# Patient Record
Sex: FEMALE | NOT HISPANIC OR LATINO
[De-identification: names, ages, dates, MRNs, and addresses within clinical notes are randomized per-mention and may not be internally consistent; named-entity substitution may affect disease eponyms.]

---

## 2024-01-02 PROBLEM — Z00.00 ENCOUNTER FOR PREVENTIVE HEALTH EXAMINATION: Status: ACTIVE | Noted: 2024-01-02

## 2024-01-03 ENCOUNTER — APPOINTMENT (OUTPATIENT)
Dept: GERIATRICS | Facility: ASSISTED LIVING FACILITY | Age: 79
End: 2024-01-03
Payer: MEDICARE

## 2024-01-03 VITALS
WEIGHT: 134 LBS | RESPIRATION RATE: 14 BRPM | HEART RATE: 58 BPM | OXYGEN SATURATION: 97 % | SYSTOLIC BLOOD PRESSURE: 118 MMHG | DIASTOLIC BLOOD PRESSURE: 74 MMHG

## 2024-01-03 DIAGNOSIS — K59.01 SLOW TRANSIT CONSTIPATION: ICD-10-CM

## 2024-01-03 DIAGNOSIS — M19.90 UNSPECIFIED OSTEOARTHRITIS, UNSPECIFIED SITE: ICD-10-CM

## 2024-01-03 DIAGNOSIS — I10 ESSENTIAL (PRIMARY) HYPERTENSION: ICD-10-CM

## 2024-01-03 PROCEDURE — 99349 HOME/RES VST EST MOD MDM 40: CPT

## 2024-01-03 NOTE — ASSESSMENT
[FreeTextEntry1] : Will c/w FeSO4 for now pending CBC and labs  Need further collateral info from daughter, including potential copy of HCP. She is NOK. I called and left a VM  Need to explore GOC. Currently full code; no MOLST on file.  Will see podiatry for onychomycosis.  Next visit in 4 weeks or PRN

## 2024-01-03 NOTE — PHYSICAL EXAM
[Alert] : alert [No Acute Distress] : in no acute distress [Normal Outer Ear/Nose] : the ears and nose were normal in appearance [Normal Appearance] : the appearance of the neck was normal [Supple] : the neck was supple [No Respiratory Distress] : no respiratory distress [No Acc Muscle Use] : no accessory muscle use [Respiration, Rhythm And Depth] : normal respiratory rhythm and effort [Auscultation Breath Sounds / Voice Sounds] : lungs were clear to auscultation bilaterally [Normal S1, S2] : normal S1 and S2 [Heart Rate And Rhythm] : heart rate was normal and rhythm regular [Edema] : edema was not present [Bowel Sounds] : normal bowel sounds [Abdomen Tenderness] : non-tender [Abdomen Soft] : soft [Normal Color / Pigmentation] : normal skin color and pigmentation [Normal Turgor] : normal skin turgor [de-identified] : does not regularly follow simple commands. sitting in wheelchair [de-identified] : onychomycosis of toenails [de-identified] : L hemiparesis. 0/5 strength SHANIQUE [de-identified] : smiling and laughing (inappropriately). no agitation. A+Ox0

## 2024-01-03 NOTE — REASON FOR VISIT
[Initial Evaluation] : an initial evaluation [Formal Caregiver] : formal caregiver [FreeTextEntry3] : ABIODUN Sotelo

## 2024-01-03 NOTE — HISTORY OF PRESENT ILLNESS
[No falls in past year] : Patient reported no falls in the past year [] : Patient is incontinent. [Completely Dependent] : Completely dependent. [Medical reason not done] : Medical reason not done [FreeTextEntry1] : 78 year old woman w/ PMH advanced dementia (FAST 7A), prior CVA w/ L hemiplegia, HTN, constipation, OA, and ?iron deficiency anemia presents for initial visit after moving into the [facility] this week.  Patient moved from [place], [place].   There are very limited records available for review.  I reached out to the patient's only daughter (?HCP) Justa Figueroa ([phone]) but had no answer. Left a VM.  Patient reportedly has a "great appetite" as per care manager Kathi. Able to partially feed herself with finger foods. She states a few words but does not really respond to conversation. She is fully dependent on all IADLs and most ADLs.  ROS otherwise negative as per RN and care manager. Has not had any agitation. Takes meds as prescribed. Sleeping ok. No falls. Needs Travis lift for transfers.  CVA: Unclear timing.  - is on atorvastatin 40 mg qd and ASA 81 mg qd  Advanced dementia: FAST 7A - remains on rivastigmine patch - ?AD vs. mixed dementia  HTN: Controlled. - on norvasc 10 mg qd  Anemia: No labs for review - is on FeSO4 5 ml liquid BID  Constipation: On senna  SH - reportedly was an  - Has one daughter, Justa Figueroa ([phone]) - No HCP on file [FreeTextEntry5] : can do finger foods [de-identified] : advanced dementia [AdvancecareDate] : 01/24 [FreeTextEntry4] : Unclear if completed Reportedly only one daughter, Justa Figueroa, who would be NOK

## 2024-01-05 RX ORDER — POLYETHYLENE GLYCOL 3350 17 G/17G
17 POWDER, FOR SOLUTION ORAL
Qty: 30 | Refills: 4 | Status: ACTIVE | COMMUNITY
Start: 2024-01-05 | End: 1900-01-01

## 2024-01-05 RX ORDER — DOCUSATE SODIUM 100 MG/1
100 CAPSULE ORAL
Qty: 30 | Refills: 0 | Status: DISCONTINUED | COMMUNITY
Start: 2024-01-03 | End: 2024-01-05

## 2024-01-18 LAB
ALBUMIN SERPL ELPH-MCNC: 4.2 G/DL
ALP BLD-CCNC: 100 U/L
ALT SERPL-CCNC: 26 U/L
ANION GAP SERPL CALC-SCNC: 14 MMOL/L
AST SERPL-CCNC: 19 U/L
BASOPHILS # BLD AUTO: 0.04 K/UL
BASOPHILS NFR BLD AUTO: 0.7 %
BILIRUB SERPL-MCNC: 0.4 MG/DL
BUN SERPL-MCNC: 39 MG/DL
CALCIUM SERPL-MCNC: 9.7 MG/DL
CHLORIDE SERPL-SCNC: 109 MMOL/L
CHOLEST SERPL-MCNC: 157 MG/DL
CO2 SERPL-SCNC: 25 MMOL/L
CREAT SERPL-MCNC: 0.54 MG/DL
EGFR: 93 ML/MIN/1.73M2
EOSINOPHIL # BLD AUTO: 0.26 K/UL
EOSINOPHIL NFR BLD AUTO: 4.3 %
GLUCOSE SERPL-MCNC: 73 MG/DL
HCT VFR BLD CALC: 45.8 %
HDLC SERPL-MCNC: 51 MG/DL
HGB BLD-MCNC: 14 G/DL
IMM GRANULOCYTES NFR BLD AUTO: 0.2 %
LDLC SERPL CALC-MCNC: 85 MG/DL
LYMPHOCYTES # BLD AUTO: 1.19 K/UL
LYMPHOCYTES NFR BLD AUTO: 19.7 %
MAN DIFF?: NORMAL
MCHC RBC-ENTMCNC: 30.4 PG
MCHC RBC-ENTMCNC: 30.6 GM/DL
MCV RBC AUTO: 99.3 FL
MONOCYTES # BLD AUTO: 0.35 K/UL
MONOCYTES NFR BLD AUTO: 5.8 %
NEUTROPHILS # BLD AUTO: 4.19 K/UL
NEUTROPHILS NFR BLD AUTO: 69.3 %
NONHDLC SERPL-MCNC: 106 MG/DL
PLATELET # BLD AUTO: 256 K/UL
POTASSIUM SERPL-SCNC: 4.3 MMOL/L
PROT SERPL-MCNC: 6.8 G/DL
RBC # BLD: 4.61 M/UL
RBC # FLD: 14.6 %
SODIUM SERPL-SCNC: 148 MMOL/L
TRIGL SERPL-MCNC: 115 MG/DL
TSH SERPL-ACNC: 1.59 UIU/ML
VIT B12 SERPL-MCNC: 364 PG/ML
WBC # FLD AUTO: 6.04 K/UL

## 2024-01-19 RX ORDER — ACETAMINOPHEN 500 MG/1
500 TABLET ORAL
Qty: 360 | Refills: 1 | Status: ACTIVE | COMMUNITY
Start: 2024-01-03 | End: 1900-01-01

## 2024-01-19 RX ORDER — ASPIRIN 81 MG/1
81 TABLET, DELAYED RELEASE ORAL DAILY
Qty: 90 | Refills: 1 | Status: ACTIVE | COMMUNITY
Start: 2024-01-03 | End: 1900-01-01

## 2024-01-31 RX ORDER — FERROUS SULFATE 300 MG/5ML
300 (60 FE) SOLUTION ORAL DAILY
Qty: 1 | Refills: 0 | Status: DISCONTINUED | COMMUNITY
Start: 2024-01-03 | End: 2024-01-31

## 2024-02-14 ENCOUNTER — APPOINTMENT (OUTPATIENT)
Dept: GERIATRICS | Facility: ASSISTED LIVING FACILITY | Age: 79
End: 2024-02-14
Payer: SELF-PAY

## 2024-02-14 VITALS
OXYGEN SATURATION: 96 % | WEIGHT: 144 LBS | RESPIRATION RATE: 12 BRPM | DIASTOLIC BLOOD PRESSURE: 74 MMHG | SYSTOLIC BLOOD PRESSURE: 128 MMHG | HEART RATE: 58 BPM

## 2024-02-14 DIAGNOSIS — I69.359 HEMIPLEGIA AND HEMIPARESIS FOLLOWING CEREBRAL INFARCTION AFFECTING UNSPECIFIED SIDE: ICD-10-CM

## 2024-02-14 PROCEDURE — 99349 HOME/RES VST EST MOD MDM 40: CPT

## 2024-02-14 NOTE — REASON FOR VISIT
· Assessment		  IMPRESSION:  Left antecubital fossa with superficial non suppurative thrombophlebitis of left basilic vein  Vascular surgical evaluation : no intervention at this point  No sepsis  BCx 7/22, 23,24   NG  UCx E fecalis  Asymptomatic bacteruria  No pyelonephritis    RECOMMENDATIONS:  Warm compressors to site  Change iv ancef to po Keflex 500 mg q8h for 5 more days  F/u with vascular surgery  Recall prn please [Follow-Up] : a follow-up visit [Formal Caregiver] : formal caregiver [FreeTextEntry3] : ABIODUN Sotelo

## 2024-02-14 NOTE — HISTORY OF PRESENT ILLNESS
[] : Patient is incontinent. [Completely Dependent] : Completely dependent. [Medical reason not done] : Medical reason not done [Reviewed updated] : Reviewed, updated [Name: ___] : Health Care Proxy's Name: [unfilled]  [Relationship: ___] : Relationship: [unfilled] [DNR] : DNR [DNI] : DNI [Last Verification Date: ___] : Last Verification Date: [unfilled] [FreeTextEntry1] : 79 year old woman w/ PMH advanced dementia (FAST 7A), prior CVA w/ L hemiplegia, HTN, constipation, OA, and ?iron deficiency anemia presents for f/u visit after moving into the [address] 1/2024.  Patient moved from [place], [place]. Previously in memory care unit at [facility] there.   Since my initial visit, I have spoken extensively with patient's daughter, Justa over the phone. Patient has been largely non-ambulatory for nearly 2 years.   ROS otherwise negative as per RN and care manager, Jorge Alberto. Has not had any agitation. Takes meds as prescribed. Sleeping ok. No falls. Needs Travis lift for transfers. Has reportedly been eating "well" (needs assistance w/ feeding) and drinking ok.  She has been stable and relatively unchanged (if not improved) in the last month.   She has been following w/ PT/OT/ST.   Hypernatremia: Na 148 on labs 1/2024. Daughter notes this was an issue in the past. She has been eating better - pending f/u labs  CVA: Large R sided CVA 9/2023 while in a memory care unit - is on atorvastatin 40 mg qd and ASA 81 mg qd  Advanced dementia: FAST 7A - remains on rivastigmine patch - ?AD vs. mixed dementia  HTN: Controlled. - on norvasc 10 mg qd  Anemia: Hgb 14 on CBC from initial visit 1/2024 - FeSO4 5 ml liquid BID was d/c'ed  Constipation: On senna  SH - reportedly was an  - Has one daughter, Justa Figueroa ([phone]) - No HCP on file [Any fall with injury in past year] : Patient reported fall with injury in the past year [FreeTextEntry5] : can do finger foods [de-identified] : advanced dementia [AdvancecareDate] : 01/24 [FreeTextEntry4] : MOLST scanned into EMR

## 2024-02-14 NOTE — PHYSICAL EXAM
[Alert] : alert [Normal Outer Ear/Nose] : the ears and nose were normal in appearance [Normal Appearance] : the appearance of the neck was normal [Supple] : the neck was supple [No Respiratory Distress] : no respiratory distress [No Acc Muscle Use] : no accessory muscle use [Respiration, Rhythm And Depth] : normal respiratory rhythm and effort [Auscultation Breath Sounds / Voice Sounds] : lungs were clear to auscultation bilaterally [Normal S1, S2] : normal S1 and S2 [Edema] : edema was not present [Heart Rate And Rhythm] : heart rate was normal and rhythm regular [Bowel Sounds] : normal bowel sounds [Abdomen Tenderness] : non-tender [Abdomen Soft] : soft [Normal Color / Pigmentation] : normal skin color and pigmentation [Normal Turgor] : normal skin turgor [de-identified] : does not regularly follow simple commands. sitting in wheelchair. some verbalizations are tangential [de-identified] : onychomycosis of toenails [de-identified] : L hemiparesis. 0/5 strength SHANIQUE [de-identified] : smiling and laughing (inappropriately). no agitation. A+Ox0

## 2024-02-16 LAB
ANION GAP SERPL CALC-SCNC: 10 MMOL/L
BASOPHILS # BLD AUTO: 0.05 K/UL
BASOPHILS NFR BLD AUTO: 0.9 %
BUN SERPL-MCNC: 30 MG/DL
CALCIUM SERPL-MCNC: 9.4 MG/DL
CHLORIDE SERPL-SCNC: 106 MMOL/L
CO2 SERPL-SCNC: 26 MMOL/L
CREAT SERPL-MCNC: 0.55 MG/DL
EGFR: 92 ML/MIN/1.73M2
EOSINOPHIL # BLD AUTO: 0.33 K/UL
EOSINOPHIL NFR BLD AUTO: 5.7 %
GLUCOSE SERPL-MCNC: 62 MG/DL
HCT VFR BLD CALC: 36.7 %
HGB BLD-MCNC: 11.6 G/DL
IMM GRANULOCYTES NFR BLD AUTO: 0.2 %
LYMPHOCYTES # BLD AUTO: 1.3 K/UL
LYMPHOCYTES NFR BLD AUTO: 22.3 %
MAGNESIUM SERPL-MCNC: 2.1 MG/DL
MAN DIFF?: NORMAL
MCHC RBC-ENTMCNC: 31.6 GM/DL
MCHC RBC-ENTMCNC: 32.2 PG
MCV RBC AUTO: 101.9 FL
MONOCYTES # BLD AUTO: 0.43 K/UL
MONOCYTES NFR BLD AUTO: 7.4 %
NEUTROPHILS # BLD AUTO: 3.72 K/UL
NEUTROPHILS NFR BLD AUTO: 63.5 %
PHOSPHATE SERPL-MCNC: 3.5 MG/DL
PLATELET # BLD AUTO: 284 K/UL
POTASSIUM SERPL-SCNC: 4.9 MMOL/L
RBC # BLD: 3.6 M/UL
RBC # FLD: 13.9 %
SODIUM SERPL-SCNC: 143 MMOL/L
WBC # FLD AUTO: 5.84 K/UL

## 2024-02-23 RX ORDER — ATORVASTATIN CALCIUM 40 MG/1
40 TABLET, FILM COATED ORAL DAILY
Qty: 90 | Refills: 1 | Status: ACTIVE | COMMUNITY
Start: 2024-01-03 | End: 1900-01-01

## 2024-03-03 RX ORDER — AMLODIPINE BESYLATE 10 MG/1
10 TABLET ORAL
Qty: 90 | Refills: 1 | Status: ACTIVE | COMMUNITY
Start: 2024-01-03 | End: 1900-01-01

## 2024-03-10 RX ORDER — RIVASTIGMINE 4.6 MG/24H
4.6 PATCH, EXTENDED RELEASE TRANSDERMAL
Qty: 90 | Refills: 1 | Status: ACTIVE | COMMUNITY
Start: 2024-01-03 | End: 1900-01-01

## 2024-03-21 RX ORDER — DOCUSATE SODIUM 50 MG/5ML
100 LIQUID ORAL DAILY
Qty: 1 | Refills: 3 | Status: ACTIVE | COMMUNITY
Start: 2024-03-21 | End: 1900-01-01

## 2024-04-03 ENCOUNTER — APPOINTMENT (OUTPATIENT)
Dept: GERIATRICS | Facility: ASSISTED LIVING FACILITY | Age: 79
End: 2024-04-03
Payer: MEDICARE

## 2024-04-03 VITALS
SYSTOLIC BLOOD PRESSURE: 124 MMHG | WEIGHT: 144.6 LBS | DIASTOLIC BLOOD PRESSURE: 78 MMHG | RESPIRATION RATE: 14 BRPM | OXYGEN SATURATION: 96 % | HEART RATE: 69 BPM

## 2024-04-03 DIAGNOSIS — D50.8 OTHER IRON DEFICIENCY ANEMIAS: ICD-10-CM

## 2024-04-03 PROCEDURE — 99349 HOME/RES VST EST MOD MDM 40: CPT

## 2024-04-03 NOTE — HISTORY OF PRESENT ILLNESS
[FreeTextEntry1] : 79 year old woman w/ PMH advanced dementia (FAST 7A), prior CVA w/ L hemiplegia, HTN, constipation, OA, and ?iron deficiency anemia presents for f/u visit after moving into the [address] 1/2024.  Patient moved from [place], [place]. Previously in memory care unit at [facility] there.   Patient has been stable. More verbal, interacts somewhat with other residents. Has been eating and drinking relatively well. Patient has been largely non-ambulatory for nearly 2 years.   ROS otherwise negative as per RN. Has not had any agitation. Takes meds as prescribed. Sleeping ok. No falls. Needs Travis lift for transfers.  She has been following w/ PT/OT/ST.   Hypernatremia: Na 148 on labs 1/2024, decreased to 143 2/2024. Daughter notes this was an issue in the past. She has been eating and drinking ok  CVA: Large R sided CVA 9/2023 while in a memory care unit - is on atorvastatin 40 mg qd and ASA 81 mg qd  Advanced dementia: FAST 7A - remains on rivastigmine patch - ?AD vs. mixed dementia  HTN: Controlled. - on norvasc 10 mg qd  Anemia: Hgb 14 on CBC from initial visit 1/2024, dropped to 11.6 2/2024 - FeSO4 5 ml liquid BID has been d/c'ed  Constipation: On senna  SH - reportedly was an  - Has one daughter, Justa Figueroa ([phone]) - No HCP on file [FreeTextEntry5] : can do finger foods [AdvancecareDate] : 01/24 [de-identified] : advanced dementia [FreeTextEntry4] : MOLST scanned into EMR

## 2024-04-03 NOTE — PHYSICAL EXAM
[de-identified] : non ambulatory [de-identified] : sitting in wheelchair. some verbalizations are tangential [de-identified] : mild onychomycosis of toenails [de-identified] : L hemiparesis. 0/5 strength SHANIQUE [de-identified] : smiling and laughing (inappropriately). no agitation. A+Ox0

## 2024-06-05 ENCOUNTER — APPOINTMENT (OUTPATIENT)
Dept: GERIATRICS | Facility: ASSISTED LIVING FACILITY | Age: 79
End: 2024-06-05
Payer: MEDICARE

## 2024-06-05 VITALS
RESPIRATION RATE: 14 BRPM | SYSTOLIC BLOOD PRESSURE: 110 MMHG | DIASTOLIC BLOOD PRESSURE: 80 MMHG | OXYGEN SATURATION: 97 % | HEART RATE: 67 BPM

## 2024-06-05 DIAGNOSIS — I10 ESSENTIAL (PRIMARY) HYPERTENSION: ICD-10-CM

## 2024-06-05 DIAGNOSIS — E87.0 HYPEROSMOLALITY AND HYPERNATREMIA: ICD-10-CM

## 2024-06-05 DIAGNOSIS — F03.C0 UNSPECIFIED DEMENTIA, SEVERE, WITHOUT BEHAVIORAL DISTURBANCE, PSYCHOTIC DISTURBANCE, MOOD DISTURBANCE, AND ANXIETY: ICD-10-CM

## 2024-06-05 PROCEDURE — 99349 HOME/RES VST EST MOD MDM 40: CPT

## 2024-06-05 NOTE — REASON FOR VISIT
[Follow-Up] : a follow-up visit [Formal Caregiver] : formal caregiver [FreeTextEntry3] : ABIODUN Meredith

## 2024-06-05 NOTE — PHYSICAL EXAM
[Alert] : alert [Normal Outer Ear/Nose] : the ears and nose were normal in appearance [Normal Appearance] : the appearance of the neck was normal [Supple] : the neck was supple [No Respiratory Distress] : no respiratory distress [No Acc Muscle Use] : no accessory muscle use [Respiration, Rhythm And Depth] : normal respiratory rhythm and effort [Auscultation Breath Sounds / Voice Sounds] : lungs were clear to auscultation bilaterally [Normal S1, S2] : normal S1 and S2 [Heart Rate And Rhythm] : heart rate was normal and rhythm regular [Edema] : edema was not present [Bowel Sounds] : normal bowel sounds [Abdomen Tenderness] : non-tender [Abdomen Soft] : soft [Normal Color / Pigmentation] : normal skin color and pigmentation [Normal Turgor] : normal skin turgor [de-identified] : sitting in wheelchair. some verbalizations are tangential; smiling [de-identified] : non ambulatory [de-identified] : mild onychomycosis of toenails [de-identified] : L hemiparesis. 0/5 strength SHANIQUE [de-identified] : smiling and laughing (inappropriately). no agitation. A+Ox0

## 2024-06-05 NOTE — HISTORY OF PRESENT ILLNESS
[Any fall with injury in past year] : Patient reported fall with injury in the past year [] : Patient is incontinent. [Completely Dependent] : Completely dependent. [Medical reason not done] : Medical reason not done [Name: ___] : Health Care Proxy's Name: [unfilled]  [Relationship: ___] : Relationship: [unfilled] [DNR] : DNR [DNI] : DNI [Last Verification Date: ___] : Last Verification Date: [unfilled] [FreeTextEntry1] : 79 year old woman w/ PMH advanced dementia (FAST 7A), prior CVA w/ L hemiplegia, HTN, constipation, OA, and ?iron deficiency anemia presents for f/u visit after moving into the [address] 1/2024.  Patient moved from [place], [place]. Previously in memory care unit at [facility] there.   Patient has remained stable. Has been eating and drinking relatively well, but reportedly does continue to pocket food occasionally. Patient has been largely non-ambulatory for >2 years.   ROS otherwise negative as per RN. Has not had any agitation. Takes meds as prescribed. Sleeping ok. No falls. Needs Travis lift for transfers.  She has been following w/ PT/OT/ST.   Hypernatremia: Na 148 on labs 1/2024, decreased to 143 2/2024. Daughter notes this was an issue in the past. She has been eating and drinking ok  CVA: Large R sided CVA 9/2023 while in a memory care unit - is on atorvastatin 40 mg qd and ASA 81 mg qd  Advanced dementia: FAST 7A - remains on rivastigmine patch - ?AD vs. mixed dementia  HTN: Controlled. - on norvasc 10 mg qd  Anemia: Hgb 14 on CBC from initial visit 1/2024, dropped to 11.6 2/2024 - FeSO4 5 ml liquid BID has been d/c'ed  Constipation: On senna  SH - reportedly was an  - Has one daughter, Justa Figueroa ([phone]) - No HCP on file [FreeTextEntry5] : can do finger foods [de-identified] : advanced dementia [Reviewed no changes] : Reviewed, no changes [AdvancecareDate] : 06/24 [FreeTextEntry4] : MOLST scanned into EMR

## 2024-06-05 NOTE — ASSESSMENT
[FreeTextEntry1] : Still working w/ PT/OT/ST  Plan of care d/w custodial staff, RN Viri. Will d/w daughter when lab results return.   Next visit in 2 months or PRN

## 2024-06-07 LAB
ALBUMIN SERPL ELPH-MCNC: 4 G/DL
ALP BLD-CCNC: 107 U/L
ALT SERPL-CCNC: 13 U/L
ANION GAP SERPL CALC-SCNC: 17 MMOL/L
AST SERPL-CCNC: 21 U/L
BASOPHILS # BLD AUTO: 0.05 K/UL
BASOPHILS NFR BLD AUTO: 0.8 %
BILIRUB SERPL-MCNC: 0.4 MG/DL
BUN SERPL-MCNC: 22 MG/DL
CALCIUM SERPL-MCNC: 9.3 MG/DL
CHLORIDE SERPL-SCNC: 105 MMOL/L
CO2 SERPL-SCNC: 20 MMOL/L
CREAT SERPL-MCNC: 0.56 MG/DL
EGFR: 92 ML/MIN/1.73M2
EOSINOPHIL # BLD AUTO: 0.36 K/UL
EOSINOPHIL NFR BLD AUTO: 5.9 %
FERRITIN SERPL-MCNC: 80 NG/ML
GLUCOSE SERPL-MCNC: 52 MG/DL
HCT VFR BLD CALC: 33.7 %
HGB BLD-MCNC: 10.4 G/DL
IMM GRANULOCYTES NFR BLD AUTO: 0.3 %
IRON SATN MFR SERPL: 8 %
IRON SERPL-MCNC: 33 UG/DL
LYMPHOCYTES # BLD AUTO: 1.57 K/UL
LYMPHOCYTES NFR BLD AUTO: 25.8 %
MAN DIFF?: NORMAL
MCHC RBC-ENTMCNC: 29.2 PG
MCHC RBC-ENTMCNC: 30.9 GM/DL
MCV RBC AUTO: 94.7 FL
MONOCYTES # BLD AUTO: 0.48 K/UL
MONOCYTES NFR BLD AUTO: 7.9 %
NEUTROPHILS # BLD AUTO: 3.6 K/UL
NEUTROPHILS NFR BLD AUTO: 59.3 %
PLATELET # BLD AUTO: 298 K/UL
POTASSIUM SERPL-SCNC: 4 MMOL/L
PROT SERPL-MCNC: 6.7 G/DL
RBC # BLD: 3.56 M/UL
RBC # BLD: 3.56 M/UL
RBC # FLD: 12.2 %
RETICS # AUTO: 1.9 %
RETICS AGGREG/RBC NFR: 68.7 K/UL
SODIUM SERPL-SCNC: 143 MMOL/L
TIBC SERPL-MCNC: 429 UG/DL
UIBC SERPL-MCNC: 396 UG/DL
WBC # FLD AUTO: 6.08 K/UL

## 2024-06-19 RX ORDER — CARBOXYMETHYLCELLULOSE SODIUM 2.5 MG/ML
0.25 SOLUTION/ DROPS OPHTHALMIC TWICE DAILY
Qty: 1 | Refills: 3 | Status: ACTIVE | COMMUNITY
Start: 2024-01-03 | End: 1900-01-01

## 2024-06-19 RX ORDER — SENNOSIDES 8.6 MG TABLETS 8.6 MG/1
8.6 TABLET ORAL
Qty: 180 | Refills: 0 | Status: ACTIVE | COMMUNITY
Start: 2024-01-03 | End: 1900-01-01

## 2024-06-27 ENCOUNTER — RX RENEWAL (OUTPATIENT)
Age: 79
End: 2024-06-27

## 2024-07-22 ENCOUNTER — RX RENEWAL (OUTPATIENT)
Age: 79
End: 2024-07-22

## 2024-07-22 DIAGNOSIS — E78.5 HYPERLIPIDEMIA, UNSPECIFIED: ICD-10-CM

## 2024-08-26 ENCOUNTER — APPOINTMENT (OUTPATIENT)
Dept: GERIATRICS | Facility: ASSISTED LIVING FACILITY | Age: 79
End: 2024-08-26
Payer: MEDICARE

## 2024-08-26 VITALS
HEART RATE: 80 BPM | WEIGHT: 153.6 LBS | RESPIRATION RATE: 14 BRPM | OXYGEN SATURATION: 98 % | DIASTOLIC BLOOD PRESSURE: 72 MMHG | SYSTOLIC BLOOD PRESSURE: 128 MMHG

## 2024-08-26 DIAGNOSIS — F03.C0 UNSPECIFIED DEMENTIA, SEVERE, WITHOUT BEHAVIORAL DISTURBANCE, PSYCHOTIC DISTURBANCE, MOOD DISTURBANCE, AND ANXIETY: ICD-10-CM

## 2024-08-26 DIAGNOSIS — D64.9 ANEMIA, UNSPECIFIED: ICD-10-CM

## 2024-08-26 DIAGNOSIS — Z71.89 OTHER SPECIFIED COUNSELING: ICD-10-CM

## 2024-08-26 DIAGNOSIS — E87.0 HYPEROSMOLALITY AND HYPERNATREMIA: ICD-10-CM

## 2024-08-26 DIAGNOSIS — I10 ESSENTIAL (PRIMARY) HYPERTENSION: ICD-10-CM

## 2024-08-26 DIAGNOSIS — E78.5 HYPERLIPIDEMIA, UNSPECIFIED: ICD-10-CM

## 2024-08-26 PROCEDURE — 99349 HOME/RES VST EST MOD MDM 40: CPT

## 2024-08-26 NOTE — ASSESSMENT
[FreeTextEntry1] : Has gained ~9 lbs since last visit. Patient has great appetite. Appears euvolemic today. Will have residential staff reweigh as weights have been historically inaccurate.   Next visit in 2 months or PRN  RUSLAN Chanel-BC

## 2024-08-26 NOTE — HISTORY OF PRESENT ILLNESS
[Any fall with injury in past year] : Patient reported fall with injury in the past year [] : Patient is incontinent. [Completely Dependent] : Completely dependent. [Medical reason not done] : Medical reason not done [Reviewed no changes] : Reviewed, no changes [DNR] : DNR [DNI] : DNI [Last Verification Date: ___] : Last Verification Date: [unfilled] [Name: ___] : Health Care Proxy's Name: [unfilled]  [Relationship: ___] : Relationship: [unfilled] [FreeTextEntry1] : 79 year old woman w/ PMH advanced dementia (FAST 7A), prior CVA w/ L hemiplegia, HTN, constipation, OA, and ?iron deficiency anemia presents for f/u visit after moving into the [address] 1/2024.  Patient moved from [place], [place]. Previously in memory care unit at [facility] there.   Patient has remained stable. Has been eating and drinking relatively well, but reportedly does continue to pocket food occasionally. Patient has been largely non-ambulatory for >2 years.   ROS otherwise negative as per RN. Has not had any agitation. Takes meds as prescribed. Sleeping ok. No falls. Needs Travis lift for transfers.  She has been following w/ PT/OT/ST.   Hypernatremia: Na 143 on 6/2024. Daughter notes this was an issue in the past. She has been eating and drinking ok  CVA: Large R sided CVA 9/2023 while in a memory care unit - is on atorvastatin 40 mg qd and ASA 81 mg qd  Advanced dementia: FAST 7A - remains on rivastigmine patch - ?AD vs. mixed dementia  HTN: Controlled. - on norvasc 10 mg qd  Anemia: Hgb 10.4 on CBC from  6/2024, -she has iron deficiency. Iron previously d/c'd as it caused constipation and reduced PO intake -extensive discussion with Dr. Dodson after visit in June- opted to monitor CBC and determine if oral vs IV iron necessary for tx   Constipation: On senna  SH - reportedly was an  - Has one daughter, Justa Figueroa ([phone]) - No HCP on file [FreeTextEntry5] : can do finger foods [de-identified] : advanced dementia [AdvancecareDate] : 08/24 [FreeTextEntry4] : MOLST scanned into EMR

## 2024-08-26 NOTE — PHYSICAL EXAM
[Alert] : alert [Normal Outer Ear/Nose] : the ears and nose were normal in appearance [Normal Appearance] : the appearance of the neck was normal [Supple] : the neck was supple [No Respiratory Distress] : no respiratory distress [No Acc Muscle Use] : no accessory muscle use [Respiration, Rhythm And Depth] : normal respiratory rhythm and effort [Auscultation Breath Sounds / Voice Sounds] : lungs were clear to auscultation bilaterally [Normal S1, S2] : normal S1 and S2 [Heart Rate And Rhythm] : heart rate was normal and rhythm regular [Edema] : edema was not present [Bowel Sounds] : normal bowel sounds [Abdomen Tenderness] : non-tender [Abdomen Soft] : soft [Normal Turgor] : normal skin turgor [Normal Color / Pigmentation] : normal skin color and pigmentation [de-identified] : sitting in wheelchair. some verbalizations are tangential; smiling [de-identified] : non ambulatory [de-identified] : mild onychomycosis of toenails [de-identified] : L hemiparesis. 0/5 strength SHANIQUE [de-identified] : smiling and laughing (inappropriately). no agitation. A+Ox0

## 2024-08-27 ENCOUNTER — LABORATORY RESULT (OUTPATIENT)
Age: 79
End: 2024-08-27

## 2024-08-28 ENCOUNTER — LABORATORY RESULT (OUTPATIENT)
Age: 79
End: 2024-08-28

## 2024-08-29 ENCOUNTER — NON-APPOINTMENT (OUTPATIENT)
Age: 79
End: 2024-08-29

## 2024-08-29 LAB
FERRITIN SERPL-MCNC: 20 NG/ML
IRON SATN MFR SERPL: 4 %
IRON SERPL-MCNC: 14 UG/DL
TIBC SERPL-MCNC: 383 UG/DL
UIBC SERPL-MCNC: 369 UG/DL

## 2024-08-30 ENCOUNTER — LABORATORY RESULT (OUTPATIENT)
Age: 79
End: 2024-08-30

## 2024-09-04 ENCOUNTER — LABORATORY RESULT (OUTPATIENT)
Age: 79
End: 2024-09-04

## 2024-09-07 ENCOUNTER — LABORATORY RESULT (OUTPATIENT)
Age: 79
End: 2024-09-07

## 2024-09-09 ENCOUNTER — LABORATORY RESULT (OUTPATIENT)
Age: 79
End: 2024-09-09

## 2024-09-11 ENCOUNTER — APPOINTMENT (OUTPATIENT)
Dept: HEMATOLOGY ONCOLOGY | Facility: CLINIC | Age: 79
End: 2024-09-11
Payer: MEDICARE

## 2024-09-11 VITALS
TEMPERATURE: 98 F | OXYGEN SATURATION: 96 % | SYSTOLIC BLOOD PRESSURE: 132 MMHG | HEART RATE: 77 BPM | DIASTOLIC BLOOD PRESSURE: 72 MMHG

## 2024-09-11 DIAGNOSIS — D64.9 ANEMIA, UNSPECIFIED: ICD-10-CM

## 2024-09-11 DIAGNOSIS — Z82.3 FAMILY HISTORY OF STROKE: ICD-10-CM

## 2024-09-11 DIAGNOSIS — Z78.9 OTHER SPECIFIED HEALTH STATUS: ICD-10-CM

## 2024-09-11 DIAGNOSIS — Z86.2 PERSONAL HISTORY OF DISEASES OF THE BLOOD AND BLOOD-FORMING ORGANS AND CERTAIN DISORDERS INVOLVING THE IMMUNE MECHANISM: ICD-10-CM

## 2024-09-11 DIAGNOSIS — Z85.828 PERSONAL HISTORY OF OTHER MALIGNANT NEOPLASM OF SKIN: ICD-10-CM

## 2024-09-11 DIAGNOSIS — Z81.8 FAMILY HISTORY OF OTHER MENTAL AND BEHAVIORAL DISORDERS: ICD-10-CM

## 2024-09-11 DIAGNOSIS — Z92.89 PERSONAL HISTORY OF OTHER MEDICAL TREATMENT: ICD-10-CM

## 2024-09-11 PROCEDURE — 99203 OFFICE O/P NEW LOW 30 MIN: CPT

## 2024-09-11 PROCEDURE — G2211 COMPLEX E/M VISIT ADD ON: CPT

## 2024-09-11 PROCEDURE — 36415 COLL VENOUS BLD VENIPUNCTURE: CPT

## 2024-09-11 NOTE — CONSULT LETTER
[Dear  ___] : Dear  [unfilled], [Consult Letter:] : I had the pleasure of evaluating your patient, [unfilled]. [( Thank you for referring [unfilled] for consultation for _____ )] : Thank you for referring [unfilled] for consultation for [unfilled] [Please see my note below.] : Please see my note below. [Consult Closing:] : Thank you very much for allowing me to participate in the care of this patient.  If you have any questions, please do not hesitate to contact me. [Sincerely,] : Sincerely, [FreeTextEntry3] : Jordana Albarran

## 2024-09-11 NOTE — REVIEW OF SYSTEMS
[Fatigue] : fatigue [Recent Change In Weight] : ~T recent weight change [Loss of Hearing] : loss of hearing [Incontinence] : incontinence [Muscle Weakness] : muscle weakness [Confused] : confusion [Negative] : Allergic/Immunologic [Fever] : no fever [Chills] : no chills [Night Sweats] : no night sweats [Eye Pain] : no eye pain [Vision Problems] : no vision problems [Nosebleeds] : no nosebleeds [Chest Pain] : no chest pain [Lower Ext Edema] : no lower extremity edema [Shortness Of Breath] : no shortness of breath [Abdominal Pain] : no abdominal pain [Skin Rash] : no skin rash [Easy Bleeding] : no tendency for easy bleeding [Easy Bruising] : no tendency for easy bruising [FreeTextEntry2] : slight weight increase [FreeTextEntry9] : Left hemiparesis [de-identified] : Advanced dementia [de-identified] : Advanced dementia

## 2024-09-11 NOTE — ASSESSMENT
[FreeTextEntry1] : Patient is an 79 year old female with a history of COVID-19 infection in 2021, CVA stroke with left hemiplegia in 2023, dementia, chronic anemia, iron deficiency, hypertension, constipation, osteoarthritis who is referred for evaluation and management of anemia. Has advanced dementia and supportive treatment was requested without investigative procedures. Very recent blood tests are consistent with iron deficiency.  Will also evaluate for B12 and/or folate deficiency. Have ordered B12 and folate. Venipuncture was performed in the office today. Patient may benefit from iron infusions. Risks of iron infusions were discussed with the patient's HCP/POA and possible adverse effects including but not exclusive of anaphylaxis, hyper or hypotension, tachycardia, dizziness, abdominal pain, nausea, vomiting, staining of skin at extravasation sites. Informed consent was obtained and witnessed.  A list of Iron rich foods was given  and discussed. Patient's daughter was advised to call the office to discuss the results and further management.

## 2024-09-11 NOTE — PHYSICAL EXAM
[Normal] : normal appearance, no rash, nodules, vesicles, ulcers, erythema [de-identified] : Left hemiparesis, wheelchair dependent, arthritic changes in hands [de-identified] : Advanced dementia, pleasant, left hemiparesis [de-identified] : Advanced dementia, pleasant

## 2024-09-11 NOTE — REASON FOR VISIT
[Initial Consultation] : an initial consultation for [Family Member] : family member [FreeTextEntry2] : Anemia, iron deficiency anemia

## 2024-09-12 LAB
FOLATE SERPL-MCNC: 11.6 NG/ML
VIT B12 SERPL-MCNC: 411 PG/ML

## 2024-09-13 ENCOUNTER — NON-APPOINTMENT (OUTPATIENT)
Age: 79
End: 2024-09-13

## 2024-09-13 ENCOUNTER — APPOINTMENT (OUTPATIENT)
Dept: HEMATOLOGY ONCOLOGY | Facility: CLINIC | Age: 79
End: 2024-09-13

## 2024-09-13 LAB
FERRITIN SERPL-MCNC: 57 NG/ML
IRON SATN MFR SERPL: 3 %
IRON SERPL-MCNC: 15 UG/DL
TIBC SERPL-MCNC: 485 UG/DL
UIBC SERPL-MCNC: 470 UG/DL

## 2024-09-20 NOTE — PHARMACY COMMUNICATION NOTE - COMMENTS
I emailed Dr. Albarran the following:                  Hi,                  Patient Jose Figueroa (: 1945) is scheduled to come in for her Feraheme infusion/B-12 injection on 2024. I have some confusion on my part as follows: the Feraheme is to be given for 2 doses 7 days apart while the Vitamin B-12 is to be given for 2 doses 2 weeks apart. Inn the special instructions you wrote for the Vitamin B-12 to be administered on the day of the Feraheme infusions. So my question to you is should we schedule the 2nd Feraheme infusion in 2 weeks so as to give the Vitamin b-12 a  week spacing OR is it ok the give both the 2nd Feraheme infusion and the Vitamin b-12 7 days after the first dose? Please let us know how to proceed. I can have our NP adjust the order based on your response.         Thank You,         Hao Quiñonez [facility]       [phone]  She responded as follows:  Israel Bui,  The B12 should be given on the same day as the second Feraheme infusion .Sorry if order is confusing. Please have the NP adjust the order. Thank you!  Jordana  We will have our NP adjust the order so the Fwraheme and Vitamin B-12 are given the same day.

## 2024-09-23 ENCOUNTER — LABORATORY RESULT (OUTPATIENT)
Age: 79
End: 2024-09-23

## 2024-09-23 ENCOUNTER — APPOINTMENT (OUTPATIENT)
Dept: INFUSION THERAPY | Facility: CLINIC | Age: 79
End: 2024-09-23

## 2024-09-23 ENCOUNTER — OUTPATIENT (OUTPATIENT)
Dept: OUTPATIENT SERVICES | Facility: HOSPITAL | Age: 79
LOS: 1 days | End: 2024-09-23
Payer: MEDICARE

## 2024-09-23 VITALS
SYSTOLIC BLOOD PRESSURE: 100 MMHG | OXYGEN SATURATION: 99 % | DIASTOLIC BLOOD PRESSURE: 57 MMHG | TEMPERATURE: 98 F | RESPIRATION RATE: 16 BRPM | HEART RATE: 71 BPM

## 2024-09-23 VITALS
TEMPERATURE: 98 F | SYSTOLIC BLOOD PRESSURE: 115 MMHG | OXYGEN SATURATION: 100 % | HEART RATE: 64 BPM | DIASTOLIC BLOOD PRESSURE: 73 MMHG | RESPIRATION RATE: 16 BRPM

## 2024-09-23 DIAGNOSIS — D50.9 IRON DEFICIENCY ANEMIA, UNSPECIFIED: ICD-10-CM

## 2024-09-23 PROCEDURE — 96372 THER/PROPH/DIAG INJ SC/IM: CPT

## 2024-09-23 PROCEDURE — 96365 THER/PROPH/DIAG IV INF INIT: CPT

## 2024-09-23 RX ORDER — FERUMOXYTOL 510 MG/17ML
510 INJECTION INTRAVENOUS ONCE
Refills: 0 | Status: COMPLETED | OUTPATIENT
Start: 2024-09-23 | End: 2024-09-23

## 2024-09-23 RX ORDER — CYANOCOBALAMIN (VITAMIN B-12) 1000MCG/ML
1000 VIAL (ML) INJECTION ONCE
Refills: 0 | Status: COMPLETED | OUTPATIENT
Start: 2024-09-23 | End: 2024-09-23

## 2024-09-23 RX ADMIN — Medication 1000 MICROGRAM(S): at 16:40

## 2024-09-23 RX ADMIN — FERUMOXYTOL 234 MILLIGRAM(S): 510 INJECTION INTRAVENOUS at 16:40

## 2024-09-23 RX ADMIN — FERUMOXYTOL 510 MILLIGRAM(S): 510 INJECTION INTRAVENOUS at 17:20

## 2024-09-24 ENCOUNTER — LABORATORY RESULT (OUTPATIENT)
Age: 79
End: 2024-09-24

## 2024-10-01 ENCOUNTER — APPOINTMENT (OUTPATIENT)
Dept: INFUSION THERAPY | Facility: CLINIC | Age: 79
End: 2024-10-01

## 2024-10-01 ENCOUNTER — OUTPATIENT (OUTPATIENT)
Dept: OUTPATIENT SERVICES | Facility: HOSPITAL | Age: 79
LOS: 1 days | End: 2024-10-01
Payer: MEDICARE

## 2024-10-01 VITALS
SYSTOLIC BLOOD PRESSURE: 118 MMHG | OXYGEN SATURATION: 96 % | DIASTOLIC BLOOD PRESSURE: 78 MMHG | RESPIRATION RATE: 18 BRPM | TEMPERATURE: 99 F | HEART RATE: 68 BPM

## 2024-10-01 VITALS
HEART RATE: 82 BPM | SYSTOLIC BLOOD PRESSURE: 115 MMHG | DIASTOLIC BLOOD PRESSURE: 79 MMHG | RESPIRATION RATE: 20 BRPM | OXYGEN SATURATION: 94 % | TEMPERATURE: 97 F

## 2024-10-01 DIAGNOSIS — D50.9 IRON DEFICIENCY ANEMIA, UNSPECIFIED: ICD-10-CM

## 2024-10-01 PROCEDURE — 96372 THER/PROPH/DIAG INJ SC/IM: CPT

## 2024-10-01 PROCEDURE — 96365 THER/PROPH/DIAG IV INF INIT: CPT

## 2024-10-01 RX ORDER — FERUMOXYTOL 510 MG/17ML
510 INJECTION INTRAVENOUS ONCE
Refills: 0 | Status: COMPLETED | OUTPATIENT
Start: 2024-10-01 | End: 2024-10-01

## 2024-10-01 RX ORDER — CYANOCOBALAMIN (VITAMIN B-12) 1000MCG/ML
1000 VIAL (ML) INJECTION ONCE
Refills: 0 | Status: COMPLETED | OUTPATIENT
Start: 2024-10-01 | End: 2024-10-01

## 2024-10-01 RX ADMIN — FERUMOXYTOL 234 MILLIGRAM(S): 510 INJECTION INTRAVENOUS at 16:37

## 2024-10-01 RX ADMIN — Medication 1000 MICROGRAM(S): at 16:26

## 2024-10-01 RX ADMIN — FERUMOXYTOL 510 MILLIGRAM(S): 510 INJECTION INTRAVENOUS at 17:07

## 2024-10-09 ENCOUNTER — APPOINTMENT (OUTPATIENT)
Dept: GERIATRICS | Facility: ASSISTED LIVING FACILITY | Age: 79
End: 2024-10-09

## 2024-10-09 DIAGNOSIS — I10 ESSENTIAL (PRIMARY) HYPERTENSION: ICD-10-CM

## 2024-10-09 DIAGNOSIS — F03.C0 UNSPECIFIED DEMENTIA, SEVERE, WITHOUT BEHAVIORAL DISTURBANCE, PSYCHOTIC DISTURBANCE, MOOD DISTURBANCE, AND ANXIETY: ICD-10-CM

## 2024-10-09 DIAGNOSIS — E78.5 HYPERLIPIDEMIA, UNSPECIFIED: ICD-10-CM

## 2024-10-09 DIAGNOSIS — Z86.2 PERSONAL HISTORY OF DISEASES OF THE BLOOD AND BLOOD-FORMING ORGANS AND CERTAIN DISORDERS INVOLVING THE IMMUNE MECHANISM: ICD-10-CM

## 2024-10-09 DIAGNOSIS — Z23 ENCOUNTER FOR IMMUNIZATION: ICD-10-CM

## 2024-10-09 DIAGNOSIS — D64.9 ANEMIA, UNSPECIFIED: ICD-10-CM

## 2024-10-09 DIAGNOSIS — M19.90 UNSPECIFIED OSTEOARTHRITIS, UNSPECIFIED SITE: ICD-10-CM

## 2024-10-09 DIAGNOSIS — E87.0 HYPEROSMOLALITY AND HYPERNATREMIA: ICD-10-CM

## 2024-10-09 PROCEDURE — 99349 HOME/RES VST EST MOD MDM 40: CPT

## 2024-10-10 PROBLEM — Z23 ENCOUNTER FOR IMMUNIZATION: Status: ACTIVE | Noted: 2024-10-10 | Resolved: 2024-10-24

## 2024-10-11 ENCOUNTER — LABORATORY RESULT (OUTPATIENT)
Age: 79
End: 2024-10-11

## 2024-10-14 ENCOUNTER — LABORATORY RESULT (OUTPATIENT)
Age: 79
End: 2024-10-14

## 2024-10-14 ENCOUNTER — RX RENEWAL (OUTPATIENT)
Age: 79
End: 2024-10-14

## 2024-10-15 ENCOUNTER — LABORATORY RESULT (OUTPATIENT)
Age: 79
End: 2024-10-15

## 2024-10-16 ENCOUNTER — LABORATORY RESULT (OUTPATIENT)
Age: 79
End: 2024-10-16

## 2024-12-16 ENCOUNTER — RX RENEWAL (OUTPATIENT)
Age: 79
End: 2024-12-16

## 2024-12-18 ENCOUNTER — APPOINTMENT (OUTPATIENT)
Dept: GERIATRICS | Facility: ASSISTED LIVING FACILITY | Age: 79
End: 2024-12-18
Payer: MEDICARE

## 2024-12-18 DIAGNOSIS — F03.C0 UNSPECIFIED DEMENTIA, SEVERE, WITHOUT BEHAVIORAL DISTURBANCE, PSYCHOTIC DISTURBANCE, MOOD DISTURBANCE, AND ANXIETY: ICD-10-CM

## 2024-12-18 DIAGNOSIS — M19.90 UNSPECIFIED OSTEOARTHRITIS, UNSPECIFIED SITE: ICD-10-CM

## 2024-12-18 DIAGNOSIS — D64.9 ANEMIA, UNSPECIFIED: ICD-10-CM

## 2024-12-18 DIAGNOSIS — E78.5 HYPERLIPIDEMIA, UNSPECIFIED: ICD-10-CM

## 2024-12-18 DIAGNOSIS — E87.0 HYPEROSMOLALITY AND HYPERNATREMIA: ICD-10-CM

## 2024-12-18 DIAGNOSIS — I10 ESSENTIAL (PRIMARY) HYPERTENSION: ICD-10-CM

## 2024-12-18 PROCEDURE — 99348 HOME/RES VST EST LOW MDM 30: CPT

## 2025-01-06 ENCOUNTER — APPOINTMENT (OUTPATIENT)
Dept: GERIATRICS | Facility: ASSISTED LIVING FACILITY | Age: 80
End: 2025-01-06
Payer: MEDICARE

## 2025-01-06 VITALS
OXYGEN SATURATION: 98 % | DIASTOLIC BLOOD PRESSURE: 80 MMHG | SYSTOLIC BLOOD PRESSURE: 122 MMHG | HEART RATE: 80 BPM | RESPIRATION RATE: 14 BRPM

## 2025-01-06 DIAGNOSIS — F03.C0 UNSPECIFIED DEMENTIA, SEVERE, WITHOUT BEHAVIORAL DISTURBANCE, PSYCHOTIC DISTURBANCE, MOOD DISTURBANCE, AND ANXIETY: ICD-10-CM

## 2025-01-06 DIAGNOSIS — R05.9 COUGH, UNSPECIFIED: ICD-10-CM

## 2025-01-06 DIAGNOSIS — I10 ESSENTIAL (PRIMARY) HYPERTENSION: ICD-10-CM

## 2025-01-06 PROCEDURE — 99349 HOME/RES VST EST MOD MDM 40: CPT

## 2025-01-07 ENCOUNTER — LABORATORY RESULT (OUTPATIENT)
Age: 80
End: 2025-01-07

## 2025-01-08 PROBLEM — R05.9 COUGH: Status: ACTIVE | Noted: 2025-01-06

## 2025-03-05 ENCOUNTER — APPOINTMENT (OUTPATIENT)
Dept: GERIATRICS | Facility: ASSISTED LIVING FACILITY | Age: 80
End: 2025-03-05

## 2025-03-05 VITALS
HEART RATE: 66 BPM | SYSTOLIC BLOOD PRESSURE: 112 MMHG | OXYGEN SATURATION: 97 % | TEMPERATURE: 97.7 F | DIASTOLIC BLOOD PRESSURE: 80 MMHG

## 2025-03-05 DIAGNOSIS — E78.5 HYPERLIPIDEMIA, UNSPECIFIED: ICD-10-CM

## 2025-03-05 DIAGNOSIS — I10 ESSENTIAL (PRIMARY) HYPERTENSION: ICD-10-CM

## 2025-03-05 DIAGNOSIS — D50.8 OTHER IRON DEFICIENCY ANEMIAS: ICD-10-CM

## 2025-03-05 DIAGNOSIS — F03.C0 UNSPECIFIED DEMENTIA, SEVERE, WITHOUT BEHAVIORAL DISTURBANCE, PSYCHOTIC DISTURBANCE, MOOD DISTURBANCE, AND ANXIETY: ICD-10-CM

## 2025-03-05 PROCEDURE — 99348 HOME/RES VST EST LOW MDM 30: CPT

## 2025-03-07 ENCOUNTER — LABORATORY RESULT (OUTPATIENT)
Age: 80
End: 2025-03-07

## 2025-03-12 ENCOUNTER — LABORATORY RESULT (OUTPATIENT)
Age: 80
End: 2025-03-12

## 2025-03-19 RX ORDER — DONEPEZIL HYDROCHLORIDE 5 MG/1
5 TABLET ORAL
Qty: 30 | Refills: 0 | Status: ACTIVE | COMMUNITY
Start: 2025-03-19 | End: 1900-01-01

## 2025-05-12 ENCOUNTER — APPOINTMENT (OUTPATIENT)
Dept: GERIATRICS | Facility: ASSISTED LIVING FACILITY | Age: 80
End: 2025-05-12

## 2025-05-14 ENCOUNTER — APPOINTMENT (OUTPATIENT)
Dept: GERIATRICS | Facility: ASSISTED LIVING FACILITY | Age: 80
End: 2025-05-14
Payer: MEDICARE

## 2025-05-14 DIAGNOSIS — Z86.2 PERSONAL HISTORY OF DISEASES OF THE BLOOD AND BLOOD-FORMING ORGANS AND CERTAIN DISORDERS INVOLVING THE IMMUNE MECHANISM: ICD-10-CM

## 2025-05-14 DIAGNOSIS — M19.90 UNSPECIFIED OSTEOARTHRITIS, UNSPECIFIED SITE: ICD-10-CM

## 2025-05-14 DIAGNOSIS — I10 ESSENTIAL (PRIMARY) HYPERTENSION: ICD-10-CM

## 2025-05-14 DIAGNOSIS — D50.8 OTHER IRON DEFICIENCY ANEMIAS: ICD-10-CM

## 2025-05-14 DIAGNOSIS — E78.5 HYPERLIPIDEMIA, UNSPECIFIED: ICD-10-CM

## 2025-05-14 DIAGNOSIS — K59.01 SLOW TRANSIT CONSTIPATION: ICD-10-CM

## 2025-05-14 DIAGNOSIS — F03.C0 UNSPECIFIED DEMENTIA, SEVERE, WITHOUT BEHAVIORAL DISTURBANCE, PSYCHOTIC DISTURBANCE, MOOD DISTURBANCE, AND ANXIETY: ICD-10-CM

## 2025-05-14 DIAGNOSIS — E87.0 HYPEROSMOLALITY AND HYPERNATREMIA: ICD-10-CM

## 2025-05-14 PROCEDURE — 99349 HOME/RES VST EST MOD MDM 40: CPT

## 2025-05-15 VITALS — WEIGHT: 155 LBS

## 2025-05-15 VITALS
TEMPERATURE: 97.5 F | SYSTOLIC BLOOD PRESSURE: 103 MMHG | RESPIRATION RATE: 18 BRPM | HEART RATE: 61 BPM | DIASTOLIC BLOOD PRESSURE: 69 MMHG | OXYGEN SATURATION: 97 %

## 2025-05-15 PROBLEM — Z86.2 HISTORY OF ANEMIA: Status: RESOLVED | Noted: 2024-08-26 | Resolved: 2025-05-15

## 2025-05-15 PROBLEM — Z86.2 HISTORY OF IRON DEFICIENCY ANEMIA: Status: RESOLVED | Noted: 2024-09-11 | Resolved: 2025-05-15

## 2025-06-16 PROBLEM — G81.94 LEFT HEMIPLEGIA: Status: ACTIVE | Noted: 2025-06-16

## 2025-07-09 ENCOUNTER — RX RENEWAL (OUTPATIENT)
Age: 80
End: 2025-07-09

## 2025-07-09 RX ORDER — DOCUSATE SODIUM 50 MG/5ML
50 LIQUID ORAL DAILY
Qty: 400 | Refills: 2 | Status: ACTIVE | COMMUNITY
Start: 2025-07-09 | End: 1900-01-01

## 2025-07-21 ENCOUNTER — APPOINTMENT (OUTPATIENT)
Dept: GERIATRICS | Facility: ASSISTED LIVING FACILITY | Age: 80
End: 2025-07-21
Payer: MEDICARE

## 2025-07-21 VITALS
DIASTOLIC BLOOD PRESSURE: 64 MMHG | RESPIRATION RATE: 14 BRPM | WEIGHT: 151 LBS | HEART RATE: 74 BPM | SYSTOLIC BLOOD PRESSURE: 122 MMHG | OXYGEN SATURATION: 98 %

## 2025-07-21 DIAGNOSIS — D50.8 OTHER IRON DEFICIENCY ANEMIAS: ICD-10-CM

## 2025-07-21 DIAGNOSIS — F03.C0 UNSPECIFIED DEMENTIA, SEVERE, WITHOUT BEHAVIORAL DISTURBANCE, PSYCHOTIC DISTURBANCE, MOOD DISTURBANCE, AND ANXIETY: ICD-10-CM

## 2025-07-21 DIAGNOSIS — I63.9 CEREBRAL INFARCTION, UNSPECIFIED: ICD-10-CM

## 2025-07-21 DIAGNOSIS — Z71.89 OTHER SPECIFIED COUNSELING: ICD-10-CM

## 2025-07-21 DIAGNOSIS — I10 ESSENTIAL (PRIMARY) HYPERTENSION: ICD-10-CM

## 2025-07-21 PROCEDURE — 99349 HOME/RES VST EST MOD MDM 40: CPT

## 2025-07-22 ENCOUNTER — LABORATORY RESULT (OUTPATIENT)
Age: 80
End: 2025-07-22

## 2025-07-23 LAB
ALBUMIN SERPL ELPH-MCNC: 4 G/DL
ALP BLD-CCNC: 141 U/L
ALT SERPL-CCNC: 17 U/L
ANION GAP SERPL CALC-SCNC: 16 MMOL/L
AST SERPL-CCNC: 20 U/L
BASOPHILS # BLD AUTO: 0.23 K/UL
BASOPHILS NFR BLD AUTO: 4.3 %
BILIRUB SERPL-MCNC: 0.5 MG/DL
BUN SERPL-MCNC: 15 MG/DL
CALCIUM SERPL-MCNC: 9.4 MG/DL
CHLORIDE SERPL-SCNC: 105 MMOL/L
CO2 SERPL-SCNC: 21 MMOL/L
CREAT SERPL-MCNC: 0.5 MG/DL
EGFRCR SERPLBLD CKD-EPI 2021: 94 ML/MIN/1.73M2
EOSINOPHIL # BLD AUTO: 0.28 K/UL
EOSINOPHIL NFR BLD AUTO: 5.2 %
FERRITIN SERPL-MCNC: 11 NG/ML
GLUCOSE SERPL-MCNC: 54 MG/DL
HCT VFR BLD CALC: 31.9 %
HGB BLD-MCNC: 8.5 G/DL
IRON SATN MFR SERPL: 3 %
IRON SERPL-MCNC: 14 UG/DL
LYMPHOCYTES # BLD AUTO: 1.22 K/UL
LYMPHOCYTES NFR BLD AUTO: 22.6 %
MAN DIFF?: NORMAL
MCHC RBC-ENTMCNC: 18.6 PG
MCHC RBC-ENTMCNC: 26.6 G/DL
MCV RBC AUTO: 69.8 FL
MONOCYTES # BLD AUTO: 0.19 K/UL
MONOCYTES NFR BLD AUTO: 3.5 %
NEUTROPHILS # BLD AUTO: 3.48 K/UL
NEUTROPHILS NFR BLD AUTO: 64.4 %
PLATELET # BLD AUTO: 390 K/UL
POTASSIUM SERPL-SCNC: 4.1 MMOL/L
PROT SERPL-MCNC: 6.9 G/DL
RBC # BLD: 4.57 M/UL
RBC # FLD: 20.4 %
SODIUM SERPL-SCNC: 142 MMOL/L
TIBC SERPL-MCNC: 481 UG/DL
TSH SERPL-ACNC: 4.63 UIU/ML
UIBC SERPL-MCNC: 467 UG/DL
WBC # FLD AUTO: 5.4 K/UL

## 2025-07-24 PROBLEM — I63.9 CVA (CEREBRAL VASCULAR ACCIDENT): Status: ACTIVE | Noted: 2025-06-16

## 2025-09-08 ENCOUNTER — APPOINTMENT (OUTPATIENT)
Dept: GERIATRICS | Facility: ASSISTED LIVING FACILITY | Age: 80
End: 2025-09-08
Payer: MEDICARE

## 2025-09-08 VITALS
DIASTOLIC BLOOD PRESSURE: 70 MMHG | SYSTOLIC BLOOD PRESSURE: 124 MMHG | WEIGHT: 154.2 LBS | RESPIRATION RATE: 14 BRPM | HEART RATE: 80 BPM | OXYGEN SATURATION: 99 %

## 2025-09-08 DIAGNOSIS — E78.5 HYPERLIPIDEMIA, UNSPECIFIED: ICD-10-CM

## 2025-09-08 DIAGNOSIS — I10 ESSENTIAL (PRIMARY) HYPERTENSION: ICD-10-CM

## 2025-09-08 DIAGNOSIS — D50.8 OTHER IRON DEFICIENCY ANEMIAS: ICD-10-CM

## 2025-09-08 PROCEDURE — 99349 HOME/RES VST EST MOD MDM 40: CPT

## 2025-09-11 ENCOUNTER — LABORATORY RESULT (OUTPATIENT)
Age: 80
End: 2025-09-11

## 2025-09-11 LAB
CBC W/ AUTO DIFF: NORMAL
COMPREHENSIVE METABOLIC PANEL: NORMAL
TSH SERPL-ACNC: NORMAL

## 2025-09-15 ENCOUNTER — LABORATORY RESULT (OUTPATIENT)
Age: 80
End: 2025-09-15

## 2025-09-18 ENCOUNTER — LABORATORY RESULT (OUTPATIENT)
Age: 80
End: 2025-09-18

## 2025-09-19 ENCOUNTER — APPOINTMENT (OUTPATIENT)
Dept: GERIATRICS | Facility: CLINIC | Age: 80
End: 2025-09-19
Payer: MEDICARE

## 2025-09-19 DIAGNOSIS — Z71.89 OTHER SPECIFIED COUNSELING: ICD-10-CM

## 2025-09-19 DIAGNOSIS — F03.C0 UNSPECIFIED DEMENTIA, SEVERE, WITHOUT BEHAVIORAL DISTURBANCE, PSYCHOTIC DISTURBANCE, MOOD DISTURBANCE, AND ANXIETY: ICD-10-CM

## 2025-09-19 DIAGNOSIS — I63.9 CEREBRAL INFARCTION, UNSPECIFIED: ICD-10-CM

## 2025-09-19 DIAGNOSIS — D64.9 ANEMIA, UNSPECIFIED: ICD-10-CM

## 2025-09-19 LAB
BASOPHILS # BLD AUTO: 0.15 K/UL
BASOPHILS NFR BLD AUTO: 2.6 %
EOSINOPHIL # BLD AUTO: 0.32 K/UL
EOSINOPHIL NFR BLD AUTO: 5.3 %
HCT VFR BLD CALC: 27.3 %
HGB BLD-MCNC: 7.4 G/DL
LYMPHOCYTES # BLD AUTO: 1.79 K/UL
LYMPHOCYTES NFR BLD AUTO: 30.1 %
MAN DIFF?: NORMAL
MCHC RBC-ENTMCNC: 18.2 PG
MCHC RBC-ENTMCNC: 27.1 G/DL
MCV RBC AUTO: 67.2 FL
MONOCYTES # BLD AUTO: 0.37 K/UL
MONOCYTES NFR BLD AUTO: 6.2 %
NEUTROPHILS # BLD AUTO: 3.27 K/UL
NEUTROPHILS NFR BLD AUTO: 53.1 %
PLATELET # BLD AUTO: 426 K/UL
RBC # BLD: 4.06 M/UL
RBC # FLD: 20.4 %
WBC # FLD AUTO: 5.95 K/UL

## 2025-09-19 PROCEDURE — 99497 ADVNCD CARE PLAN 30 MIN: CPT | Mod: 93

## 2025-09-19 PROCEDURE — 99215 OFFICE O/P EST HI 40 MIN: CPT | Mod: 93

## 2025-09-19 PROCEDURE — 99498 ADVNCD CARE PLAN ADDL 30 MIN: CPT | Mod: 93
